# Patient Record
Sex: FEMALE | Race: WHITE | Employment: UNEMPLOYED | URBAN - METROPOLITAN AREA
[De-identification: names, ages, dates, MRNs, and addresses within clinical notes are randomized per-mention and may not be internally consistent; named-entity substitution may affect disease eponyms.]

---

## 2022-08-20 ENCOUNTER — HOSPITAL ENCOUNTER (EMERGENCY)
Age: 27
Discharge: HOME | End: 2022-08-20
Attending: EMERGENCY MEDICINE
Payer: MEDICAID

## 2022-08-20 ENCOUNTER — HOSPITAL ENCOUNTER (EMERGENCY)
Dept: GENERAL RADIOLOGY | Age: 27
Discharge: HOME | End: 2022-08-23
Payer: MEDICAID

## 2022-08-20 VITALS
HEART RATE: 93 BPM | HEIGHT: 65 IN | SYSTOLIC BLOOD PRESSURE: 115 MMHG | DIASTOLIC BLOOD PRESSURE: 82 MMHG | TEMPERATURE: 98.6 F | WEIGHT: 293 LBS | BODY MASS INDEX: 48.82 KG/M2 | OXYGEN SATURATION: 100 % | RESPIRATION RATE: 19 BRPM

## 2022-08-20 DIAGNOSIS — S83.91XA SPRAIN OF RIGHT KNEE, UNSPECIFIED LIGAMENT, INITIAL ENCOUNTER: Primary | ICD-10-CM

## 2022-08-20 PROCEDURE — 99283 EMERGENCY DEPT VISIT LOW MDM: CPT

## 2022-08-20 PROCEDURE — 6370000000 HC RX 637 (ALT 250 FOR IP): Performed by: PHYSICIAN ASSISTANT

## 2022-08-20 PROCEDURE — 73562 X-RAY EXAM OF KNEE 3: CPT

## 2022-08-20 RX ORDER — IBUPROFEN 800 MG/1
800 TABLET ORAL
Status: COMPLETED | OUTPATIENT
Start: 2022-08-20 | End: 2022-08-20

## 2022-08-20 RX ORDER — IBUPROFEN 800 MG/1
800 TABLET ORAL EVERY 8 HOURS PRN
Qty: 21 TABLET | Refills: 0 | Status: SHIPPED | OUTPATIENT
Start: 2022-08-20 | End: 2022-08-27

## 2022-08-20 RX ADMIN — IBUPROFEN 800 MG: 800 TABLET, FILM COATED ORAL at 13:22

## 2022-08-20 ASSESSMENT — PAIN SCALES - GENERAL
PAINLEVEL_OUTOF10: 10

## 2022-08-20 ASSESSMENT — PAIN DESCRIPTION - ORIENTATION
ORIENTATION: RIGHT

## 2022-08-20 ASSESSMENT — PAIN DESCRIPTION - DESCRIPTORS
DESCRIPTORS: ACHING
DESCRIPTORS: ACHING

## 2022-08-20 ASSESSMENT — PAIN DESCRIPTION - LOCATION
LOCATION: KNEE

## 2022-08-20 ASSESSMENT — PAIN - FUNCTIONAL ASSESSMENT: PAIN_FUNCTIONAL_ASSESSMENT: 0-10

## 2022-08-20 ASSESSMENT — PAIN DESCRIPTION - FREQUENCY: FREQUENCY: CONTINUOUS

## 2022-08-20 ASSESSMENT — ENCOUNTER SYMPTOMS
SHORTNESS OF BREATH: 0
COLOR CHANGE: 0

## 2022-08-20 NOTE — ED NOTES
I have reviewed discharge instructions with the patient. The patient verbalized understanding. Patient left ED via Discharge Method: ambulatory to Home with significant other. Opportunity for questions and clarification provided. Patient given 1 scripts. To continue your aftercare when you leave the hospital, you may receive an automated call from our care team to check in on how you are doing. This is a free service and part of our promise to provide the best care and service to meet your aftercare needs.  If you have questions, or wish to unsubscribe from this service please call [phone]. Thank you for Choosing our [facility] Emergency Department.         Williams Mora RN  08/20/22 1989

## 2022-08-20 NOTE — DISCHARGE INSTRUCTIONS
Use knee immobilizer and crutches for immobilization as needed for pain with flexion or weightbearing. Alternate tylenol and motrin as needed for fever or body aches. You can take tylenol every 4 hours as needed. You can take ibuprofen every 8 hours as needed. If you do not have improvement in your symptoms over the next 1 week, call to schedule a follow up appointment with [employer]. Return to the ER for any new or worsening symptoms.

## 2022-08-20 NOTE — ED TRIAGE NOTES
Pt seated on wheelchair with mask in place. Reports having right knee pain that she woke with this morning. States yesterday while she was at work \"I heard a really loud pop\". Denies pain when this occurred. Denies recent injury or trauma. States today the pain worsens with movement and weigh bearing.

## 2022-08-20 NOTE — ED PROVIDER NOTES
[employer] Emergency Department Provider Note                   PCP:                No primary care provider on file. Age: 23 y.o. Sex: female       ICD-10-CM    1. Sprain of right knee, unspecified ligament, initial encounter  S83. [employer]XA [employer] Crutches; Pair, Right Side Injury; Med (5'2\"-5'10\")     CANCELED: [employer] Knee Immobilizer, Right; 24\"; Crutches; Pair, Right Side Injury; Med (5'2\"-5'10\")          DISPOSITION Decision To Discharge 08/20/2022 01:50:57 PM        MDM  Number of Diagnoses or Management Options  Sprain of right knee, unspecified ligament, initial encounter  Diagnosis management comments: Ddx:, Strain, contusion, meniscal injury, ligamentous injury, DVT, cyst, septic arthritis, cellulitis    Overall patient is a well-appearing 67-year-old female presenting today for evaluation of right knee injury. She bent down yesterday and felt a \"pop\". Her x-ray is unremarkable. Her physical exam reveals no obvious swelling, discoloration, warmth. No signs to suggest infectious etiology. Patient placed in Ace wrap, given crutches and we discussed RICE at home. If no improvement over the next couple of days, patient will follow-up with orthopedics. Amount and/or Complexity of Data Reviewed  Tests in the radiology section of CPT®: ordered and reviewed    Patient Progress  Patient progress: stable       Orders Placed This Encounter   Procedures    XR KNEE RIGHT (3 VIEWS)    [employer] Crutches; Pair, Right Side Injury; Med (5'2\"-5'10\")        Claudia Ruelas is a 23 y.o. female who presents to the Emergency Department with chief complaint of    Chief Complaint   Patient presents with    Knee Pain      67-year-old well-appearing female presents today for evaluation of right knee pain and injury.   Patient states that yesterday she was squatting down to pick something up off the ground and she felt a \"pop\" in her knee.  She states that she did not really have any pain at all yesterday but when she woke up this morning she had a significant amount of pain in her knee. She states pain is localized on the lateral aspect of the knee and wrapping around to the posterior aspect. She denies any associated swelling, numbness or tingling distally. She states she is able to bear weight but she does report significant pain with ambulation or with full flexion of the knee. She denies any history of previous knee injury or surgery. She states that she presented to another emergency room today but left without being seen due to the wait times. The history is provided by the patient. No  was used. Review of Systems   Constitutional:  Negative for activity change and fever. Respiratory:  Negative for shortness of breath. Cardiovascular:  Negative for chest pain. Musculoskeletal:  Negative for gait problem and joint swelling. Right knee injury   Skin:  Negative for color change. Neurological:  Negative for weakness and numbness. No past medical history on file. No past surgical history on file. No family history on file. Social History     Socioeconomic History    Marital status: Single         Fish allergy and Zantac [ranitidine]     Discharge Medication List as of 8/20/2022  2:02 PM           Vitals signs and nursing note reviewed. Patient Vitals for the past 4 hrs:   Temp Pulse Resp BP SpO2   08/20/22 1411 98.6 °F (37 °C) 93 19 115/82 100 %   08/20/22 1318 97.4 °F (36.3 °C) 100 19 129/89 99 %          Physical Exam  Vitals and nursing note reviewed. Constitutional:       Appearance: Normal appearance. Comments: Body mass index is 53.58 kg/m². HENT:      Head: Normocephalic and atraumatic.    Eyes:      Conjunctiva/sclera: Conjunctivae normal.   Pulmonary:      Effort: Pulmonary effort is normal.   Musculoskeletal:      Right knee: No swelling, deformity,